# Patient Record
Sex: MALE | Race: OTHER | HISPANIC OR LATINO | Employment: FULL TIME | URBAN - METROPOLITAN AREA
[De-identification: names, ages, dates, MRNs, and addresses within clinical notes are randomized per-mention and may not be internally consistent; named-entity substitution may affect disease eponyms.]

---

## 2020-10-21 ENCOUNTER — OFFICE VISIT (OUTPATIENT)
Dept: URGENT CARE | Age: 25
End: 2020-10-21
Payer: COMMERCIAL

## 2020-10-21 VITALS
OXYGEN SATURATION: 100 % | HEIGHT: 68 IN | RESPIRATION RATE: 18 BRPM | TEMPERATURE: 98.1 F | HEART RATE: 95 BPM | BODY MASS INDEX: 28.79 KG/M2 | WEIGHT: 190 LBS

## 2020-10-21 DIAGNOSIS — Z11.59 SPECIAL SCREENING EXAMINATION FOR UNSPECIFIED VIRAL DISEASE: Primary | ICD-10-CM

## 2020-10-21 PROCEDURE — U0003 INFECTIOUS AGENT DETECTION BY NUCLEIC ACID (DNA OR RNA); SEVERE ACUTE RESPIRATORY SYNDROME CORONAVIRUS 2 (SARS-COV-2) (CORONAVIRUS DISEASE [COVID-19]), AMPLIFIED PROBE TECHNIQUE, MAKING USE OF HIGH THROUGHPUT TECHNOLOGIES AS DESCRIBED BY CMS-2020-01-R: HCPCS | Performed by: PHYSICIAN ASSISTANT

## 2020-10-21 PROCEDURE — S9083 URGENT CARE CENTER GLOBAL: HCPCS | Performed by: PHYSICIAN ASSISTANT

## 2020-10-21 PROCEDURE — G0382 LEV 3 HOSP TYPE B ED VISIT: HCPCS | Performed by: PHYSICIAN ASSISTANT

## 2020-10-23 ENCOUNTER — DOCUMENTATION (OUTPATIENT)
Dept: URGENT CARE | Age: 25
End: 2020-10-23

## 2020-10-23 LAB — SARS-COV-2 RNA SPEC QL NAA+PROBE: NOT DETECTED

## 2023-07-22 ENCOUNTER — APPOINTMENT (EMERGENCY)
Dept: RADIOLOGY | Facility: HOSPITAL | Age: 28
End: 2023-07-22

## 2023-07-22 ENCOUNTER — HOSPITAL ENCOUNTER (EMERGENCY)
Facility: HOSPITAL | Age: 28
Discharge: HOME | End: 2023-07-22
Attending: EMERGENCY MEDICINE | Admitting: EMERGENCY MEDICINE

## 2023-07-22 VITALS
OXYGEN SATURATION: 99 % | SYSTOLIC BLOOD PRESSURE: 131 MMHG | TEMPERATURE: 98.3 F | HEART RATE: 68 BPM | DIASTOLIC BLOOD PRESSURE: 78 MMHG | RESPIRATION RATE: 18 BRPM

## 2023-07-22 DIAGNOSIS — S62.336A: Primary | ICD-10-CM

## 2023-07-22 DIAGNOSIS — S62.334A: ICD-10-CM

## 2023-07-22 PROCEDURE — 90471 IMMUNIZATION ADMIN: CPT

## 2023-07-22 PROCEDURE — 99284 EMERGENCY DEPT VISIT MOD MDM: CPT | Performed by: PHYSICIAN ASSISTANT

## 2023-07-22 PROCEDURE — 99283 EMERGENCY DEPT VISIT LOW MDM: CPT

## 2023-07-22 PROCEDURE — 90715 TDAP VACCINE 7 YRS/> IM: CPT | Performed by: PHYSICIAN ASSISTANT

## 2023-07-22 PROCEDURE — 73130 X-RAY EXAM OF HAND: CPT

## 2023-07-22 RX ORDER — HYDROCODONE BITARTRATE AND ACETAMINOPHEN 5; 325 MG/1; MG/1
1 TABLET ORAL EVERY 6 HOURS PRN
Qty: 10 TABLET | Refills: 0 | Status: SHIPPED | OUTPATIENT
Start: 2023-07-22 | End: 2023-07-26

## 2023-07-22 RX ORDER — CEPHALEXIN 250 MG/1
500 CAPSULE ORAL ONCE
Status: COMPLETED | OUTPATIENT
Start: 2023-07-22 | End: 2023-07-22

## 2023-07-22 RX ORDER — CEPHALEXIN 500 MG/1
500 CAPSULE ORAL EVERY 6 HOURS SCHEDULED
Qty: 28 CAPSULE | Refills: 0 | Status: SHIPPED | OUTPATIENT
Start: 2023-07-22 | End: 2023-07-29

## 2023-07-22 RX ORDER — ACETAMINOPHEN 325 MG/1
650 TABLET ORAL ONCE
Status: COMPLETED | OUTPATIENT
Start: 2023-07-22 | End: 2023-07-22

## 2023-07-22 RX ADMIN — TETANUS TOXOID, REDUCED DIPHTHERIA TOXOID AND ACELLULAR PERTUSSIS VACCINE, ADSORBED 0.5 ML: 5; 2.5; 8; 8; 2.5 SUSPENSION INTRAMUSCULAR at 11:25

## 2023-07-22 RX ADMIN — CEPHALEXIN 500 MG: 250 CAPSULE ORAL at 12:02

## 2023-07-22 RX ADMIN — ACETAMINOPHEN 325MG 650 MG: 325 TABLET ORAL at 11:25

## 2023-07-22 NOTE — Clinical Note
Jelena Headley was seen and treated in our emergency department on 7/22/2023. No work until cleared by Family Doctor/Orthopedics        Diagnosis:     Adama Crawford  . He may return on this date: If you have any questions or concerns, please don't hesitate to call.       Phoenix Joya PA-C    ______________________________           _______________          _______________  Hospital Representative                              Date                                Time

## 2023-07-22 NOTE — ED PROVIDER NOTES
History  Chief Complaint   Patient presents with   • Hand Injury     Pt hit metal forklift this morning at work. R hand injury, swelling/bleeding noted to area. Patient is a 35 y/o male with no significant PMH who presents for evaluation of right hand injury. He states he was at work this morning when he got into an argument with a coworker. He states he was upset and punched a forklift. He has pain, swelling, deformity to the 4th and 5th digits of the right hand. He has a small cut over the 5th MCP joint with a small amount of oozing blood. He has decreased ROM of the 4th and 5th digits. He came straight here for evaluation. He is right handed. He is unsure when his last tetanus immunization was. He denies other injury. Denies fever chills, nausea, vomiting, numbness. None       History reviewed. No pertinent past medical history. History reviewed. No pertinent surgical history. History reviewed. No pertinent family history. I have reviewed and agree with the history as documented. E-Cigarette/Vaping   • E-Cigarette Use Never User      E-Cigarette/Vaping Substances     Social History     Tobacco Use   • Smoking status: Every Day     Packs/day: 1.00     Types: Cigarettes   • Smokeless tobacco: Never   Vaping Use   • Vaping Use: Never used   Substance Use Topics   • Alcohol use: Never   • Drug use: Never       Review of Systems   Constitutional: Negative for chills and fever. HENT: Negative for ear pain and sore throat. Eyes: Negative for visual disturbance. Respiratory: Negative for shortness of breath. Cardiovascular: Negative for chest pain. Gastrointestinal: Negative for abdominal pain, diarrhea, nausea and vomiting. Genitourinary: Negative for difficulty urinating. Musculoskeletal: Positive for arthralgias and joint swelling. Negative for back pain. Skin: Positive for wound. Negative for color change and rash.    Neurological: Positive for numbness (tingling to right hand/fingers). All other systems reviewed and are negative. Physical Exam  Physical Exam  Vitals and nursing note reviewed. Constitutional:       General: He is not in acute distress. Appearance: Normal appearance. He is well-developed. He is not ill-appearing, toxic-appearing or diaphoretic. HENT:      Head: Normocephalic and atraumatic. Right Ear: External ear normal.      Left Ear: External ear normal.   Eyes:      Conjunctiva/sclera: Conjunctivae normal.   Cardiovascular:      Rate and Rhythm: Normal rate and regular rhythm. Heart sounds: Normal heart sounds. No murmur heard. Pulmonary:      Effort: Pulmonary effort is normal. No respiratory distress. Breath sounds: Normal breath sounds. No stridor. No wheezing, rhonchi or rales. Abdominal:      General: Abdomen is flat. There is no distension. Musculoskeletal:         General: No swelling. Cervical back: Normal range of motion. Comments: Right hand- pain, swelling, deformity to the 4th and 5th distal metacarpals and MCP joint. There is a small cut over the 5th MCP joint with mild oozing. No foreign body/contamination. Limited ROM of the 4th and 5th digit/MCP joint. No wrist pain/deformity. NVI distally. Radial pulse intact. Capillary refill intact. Skin:     General: Skin is warm. Capillary Refill: Capillary refill takes less than 2 seconds. Neurological:      Mental Status: He is alert.    Psychiatric:         Mood and Affect: Mood normal.         Vital Signs  ED Triage Vitals   Temperature Pulse Respirations Blood Pressure SpO2   07/22/23 1112 07/22/23 1110 07/22/23 1110 07/22/23 1110 07/22/23 1110   98.3 °F (36.8 °C) 68 18 131/78 99 %      Temp Source Heart Rate Source Patient Position - Orthostatic VS BP Location FiO2 (%)   07/22/23 1112 07/22/23 1110 07/22/23 1110 07/22/23 1110 --   Oral Monitor Sitting Left arm       Pain Score       07/22/23 1110       10 - Worst Possible Pain           Vitals: 07/22/23 1110   BP: 131/78   Pulse: 68   Patient Position - Orthostatic VS: Sitting         Visual Acuity      ED Medications  Medications   acetaminophen (TYLENOL) tablet 650 mg (650 mg Oral Given 7/22/23 1125)   tetanus-diphtheria-acellular pertussis (BOOSTRIX) IM injection 0.5 mL (0.5 mL Intramuscular Given 7/22/23 1125)   cephalexin (KEFLEX) capsule 500 mg (500 mg Oral Given 7/22/23 1202)       Diagnostic Studies  Results Reviewed     None                 XR hand 3+ views RIGHT    (Results Pending)              Procedures  Splint application    Date/Time: 7/22/2023 12:30 PM    Performed by: Bandar Rutledge PA-C  Authorized by: Bandar Rutledge PA-C  Universal Protocol:  Procedure performed by: (ED nurse and tech)  Consent: Verbal consent obtained. Consent given by: patient  Time out: Immediately prior to procedure a "time out" was called to verify the correct patient, procedure, equipment, support staff and site/side marked as required. Patient understanding: patient states understanding of the procedure being performed  Site marked: the operative site was marked  Radiology Images displayed and confirmed. If images not available, report reviewed: imaging studies available  Patient identity confirmed: verbally with patient      Pre-procedure details:     Sensation:  Normal  Procedure details:     Laterality:  Right    Location:  Hand    Hand:  R hand    Splint type:  Ulnar gutter    Supplies:  Cotton padding, Ortho-Glass and elastic bandage  Post-procedure details:     Pain:  Improved    Sensation:  Normal    Patient tolerance of procedure: Tolerated well, no immediate complications             ED Course                                             Medical Decision Making  Plan- will check right hand xray. Will give tylenol and boostrix.    Xray right hand- fracture of the 4th and 5th distal metacarpals with angulation/dislocation  Tiger text sent to hand surgery on call Dr. Carly Song including pictures of xrays and clinical images. Recommends cleaning off wound with betadine and rinsing with a liter of saline over the wound. Dress with xeroform and apply ulnar gutter splint. They will get the patient into the clinic this week. Update tetanus and give 7 days of Keflex as precaution. Will likely benefit from pinning as outpatient. Do not need to attempt reduction as it will likely not stay. Discussed all results/reviewed xrays with the patient. The management plan was discussed in detail with the patient at bedside and all questions were answered. Merril Speed to discharge, we provided both verbal and written instructions.  We discussed with the patient the signs and symptoms for which to return to the emergency department.  All questions were answered and patient was comfortable with the plan of care and discharged to home.  Instructed the patient to follow up with the primary care provider and/or specialist provided and their written instructions.  The patient verbalized understanding of our discussion and plan of care, and agrees to return to the Emergency Department for concerns and progression of illness.     At discharge, I instructed the patient to:  -follow up with pcp  -follow up with the recommended specialists- orthopedics/hand surgery   -return to the ER if symptoms worsened or new symptoms arose  Patient agreed to this plan and was stable at time of discharge. Displaced fracture of neck of right fifth metacarpal bone: acute illness or injury  Displaced fracture of neck of right fourth metacarpal bone: acute illness or injury  Amount and/or Complexity of Data Reviewed  Radiology: ordered and independent interpretation performed. Decision-making details documented in ED Course. Discussion of management or test interpretation with external provider(s): Hand surgery Dr. Rosy Avila  OTC drugs. Prescription drug management.           Disposition  Final diagnoses:   Displaced fracture of neck of right fifth metacarpal bone   Displaced fracture of neck of right fourth metacarpal bone     Time reflects when diagnosis was documented in both MDM as applicable and the Disposition within this note     Time User Action Codes Description Comment    7/22/2023 12:13 PM Phyllistine Esquivel Add [S62.339A] Fracture of metacarpal neck of right hand, closed     7/22/2023 12:13 PM Phyllistine Esquivel Remove [S62.339A] Fracture of metacarpal neck of right hand, closed     7/22/2023 12:13 PM Phyllistine Esquivel Add [S62.336A] Displaced fracture of neck of right fifth metacarpal bone     7/22/2023 12:14 PM Phyllistine Esquivel Add [G52.693Y] Displaced fracture of neck of right fourth metacarpal bone       ED Disposition     ED Disposition   Discharge    Condition   Stable    Date/Time   Sat Jul 22, 2023 12:14 PM    Comment   Burnice Lock discharge to home/self care.                Follow-up Information     Follow up With Specialties Details Why Contact Info Additional Information    Alondra Levi MD Orthopedic Surgery, Hand Surgery Schedule an appointment as soon as possible for a visit in 1 day  [address] [facility]        [address] [place] [address] Orthopedic Surgery Call  Call the office Monday morning to set up an appointment for your hand fractures [address]  [address] [address]  [address] [place] [address], [address] [place], [place], [address]   [facility] Emergency Department Emergency Medicine  If symptoms worsen [address] [address]  [address] [facility] Emergency Department, [address] [facility], [place], [address]          Discharge Medication List as of 7/22/2023 12:39 PM      START taking these medications    Details   cephalexin (KEFLEX) 500 mg capsule Take 1 capsule (500 mg total) by mouth every 6 (six) hours for 7 days, Starting Sat 7/22/2023, Until Sat 7/29/2023, Normal      HYDROcodone-acetaminophen (Norco) 5-325 mg per tablet Take 1 tablet by mouth every 6 (six) hours as needed for pain Max Daily Amount: 4 tablets, Starting Sat 7/22/2023, Normal                 PDMP Review     None          ED Provider  Electronically Signed by           Mason López PA-C  07/22/23 9166

## 2023-07-24 ENCOUNTER — OFFICE VISIT (OUTPATIENT)
Dept: OBGYN CLINIC | Facility: CLINIC | Age: 28
End: 2023-07-24

## 2023-07-24 VITALS — HEIGHT: 68 IN | BODY MASS INDEX: 31.71 KG/M2 | WEIGHT: 209.2 LBS

## 2023-07-24 DIAGNOSIS — S62.336A: ICD-10-CM

## 2023-07-24 DIAGNOSIS — S62.334A: ICD-10-CM

## 2023-07-24 DIAGNOSIS — S62.304A CLOSED DISPLACED FRACTURE OF FOURTH METACARPAL BONE OF RIGHT HAND, UNSPECIFIED PORTION OF METACARPAL, INITIAL ENCOUNTER: Primary | ICD-10-CM

## 2023-07-24 DIAGNOSIS — S62.306A CLOSED DISPLACED FRACTURE OF FIFTH METACARPAL BONE OF RIGHT HAND, UNSPECIFIED PORTION OF METACARPAL, INITIAL ENCOUNTER: ICD-10-CM

## 2023-07-24 PROCEDURE — 99204 OFFICE O/P NEW MOD 45 MIN: CPT | Performed by: STUDENT IN AN ORGANIZED HEALTH CARE EDUCATION/TRAINING PROGRAM

## 2023-07-24 PROCEDURE — 29125 APPL SHORT ARM SPLINT STATIC: CPT | Performed by: STUDENT IN AN ORGANIZED HEALTH CARE EDUCATION/TRAINING PROGRAM

## 2023-07-24 NOTE — TELEPHONE ENCOUNTER
Caller: Patient     Doctor/Office: Hand     Call regarding :  Scheduling appointment      Call was transferred to: Sub Scheduling

## 2023-07-24 NOTE — H&P (VIEW-ONLY)
ORTHOPAEDIC HAND, WRIST, AND ELBOW OFFICE  VISIT       ASSESSMENT/PLAN:      Diagnoses and all orders for this visit:    Closed displaced fracture of fourth metacarpal bone of right hand, unspecified portion of metacarpal, initial encounter  -     Splint application  -     Case request operating room: CLOSED REDUCTION PERCUTANEOUS PINNING VERSUS OPEN REDUCTION W/ INTERNAL FIXATION (ORIF) FOURTH AND FIFTH METACARPAL; Standing  -     Case request operating room: CLOSED REDUCTION PERCUTANEOUS PINNING VERSUS OPEN REDUCTION W/ INTERNAL FIXATION (ORIF) FOURTH AND FIFTH METACARPAL    Closed displaced fracture of fifth metacarpal bone of right hand, unspecified portion of metacarpal, initial encounter  -     Splint application    Other orders  -     Diet NPO; Sips with meds; Standing  -     Void on call to OR; Standing  -     Insert peripheral IV; Standing  -     ceFAZolin (ANCEF) 2,000 mg in dextrose 5 % 100 mL IVPB          34 y.o. male with right fourth and fifth metacarpal neck fractures, DOI 7/22/23  Treatment options and expected outcomes were discussed. The patient verbalized understanding of exam findings and treatment plan. The patient was given the opportunity to ask questions. Questions were answered to the patient's satisfaction. Discussed non operative versus surgical intervention. I discussed with the patient that since he has two metacarpal fractures I would recommend surgical intervention. Discussed if we treat this non operatively he may have an extensor lag and dropped knuckles. The patient decided to move forward with surgical intervention via shared decision making. The patient elected to proceed with surgical intervention in the form of CRPP versus ORIF right fourth and fifth metacarpals.  .Risks of the surgery are inclusive of but not limited to bleeding, infection, nerve injury, blood clot, worsening of symptoms, not achieving the anticipated results, persistent stiffness, weakness and the need for additional surgery. The patient verbally stated they understood those risks and would like to proceed with the surgery. Surgical consent was signed in the office today. I will see him the day of surgery. He was placed into an ulnar gutter splint to include the long finger in the office today. Follow Up: After surgery       To Do Next Visit:  Splint off and X-rays of the  right  hand with 10 degree supinated lateral view      Discussions:  Fracture Operative Treatment: The physiology of a fractured bone was discussed with the patient today. With non-displaced or minimally displaced fractures, conservative treatment such as casting or splinting often results in a functional recovery. Typically, these fractures are immobilized in either a cast or splint depending on the pattern. Radiographs are typically taken at intervals throughout the fracture healing to ensure that reduction or alignment is not lost.  If the fracture loses its alignment, surgical intervention may be required to stabilize it. Medical conditions such as diabetes, osteoporosis, vitamin D deficiency, and a history of or exposure to smoking may delay or prevent fracture healing. Options between cast/splint immobilization and surgical treatment were offered and the risks and benefits of both were discussed. With displaced fractures, operative treatment often results in a functional recovery. Typically, these fractures undergo reduction either through percutaneous or open methods depending on the location and fracture pattern. Radiographs are typically taken at intervals throughout the fracture healing ensure maintenance of reduction and alignment. If the fracture loses its alignment, revision surgery may be required. Medical conditions such as diabetes, osteoporosis, vitamin D deficiency, and a history of or exposure to smoking may delay or prevent fracture healing.   The risks and benefits of the procedure were explained to the patient, which include, but are not limited to: Bleeding, infection, recurrence, pain, scar, malunion, nonunion, damage to tendons, damage to nerves, and damage to blood vessels, and complications related to anesthesia, failure to give desire result, need for more surgery. These risks, along with alternative conservative treatment options, and postoperative protocols were voiced back and understood by the patient. All questions were answered to the patient's satisfaction. The patient agrees to comply with a standard postoperative protocol, and is willing to proceed. Education was provided via written and auditory forms. There were no barriers to learning. Written handouts regarding wound care, incision and scar care, and general preoperative information was provided to the patient. Prior to surgery, the patient may be requested to stop all anti-inflammatory medications. Prophylactic aspirin, Plavix, and Coumadin may be allowed to be continued. Medications including vitamin E., ginkgo, and fish oil are requested to be stopped approximately one week prior to surgery. Hypertensive medications and beta blockers, if taken, should be continued. Henretta Rubinstein, MD  Attending, Orthopaedic Surgery  Hand, Wrist, and Elbow Surgery  [facility]    ____________________________________________________________________________________________________________________________________________      CHIEF COMPLAINT:  Chief Complaint   Patient presents with   • Right Hand - Pain       SUBJECTIVE:  Patient is a 34 y.o. RHD male who presents today for evaluation and treatment of right hand pain. The patient is referred by Velia Costa PA-C. The patient states he was at work on 7/22/23 and punched a metal forklift. He noted immediate pain in his hand. He presented to the ED after the injury where x-rays were taken and he was placed in a splint. The patient has been tolerating the splint well.  He notes intermittent pain over his 4th and 5th metacarpals. He has been taking Hydrocodone as needed for pain. He was also prescribed Keflex which he has been compliant with. The patient works at [employer]. He has been out of work since. I have personally reviewed all the relevant PMH, PSH, SH, FH, Medications and allergies      PAST MEDICAL HISTORY:  History reviewed. No pertinent past medical history. PAST SURGICAL HISTORY:  History reviewed. No pertinent surgical history. FAMILY HISTORY:  History reviewed. No pertinent family history. SOCIAL HISTORY:  Social History     Tobacco Use   • Smoking status: Every Day     Packs/day: 1.00     Types: Cigarettes   • Smokeless tobacco: Never   Vaping Use   • Vaping Use: Never used   Substance Use Topics   • Alcohol use: Never   • Drug use: Never       MEDICATIONS:    Current Outpatient Medications:   •  cephalexin (KEFLEX) 500 mg capsule, Take 1 capsule (500 mg total) by mouth every 6 (six) hours for 7 days, Disp: 28 capsule, Rfl: 0  •  HYDROcodone-acetaminophen (Norco) 5-325 mg per tablet, Take 1 tablet by mouth every 6 (six) hours as needed for pain Max Daily Amount: 4 tablets, Disp: 10 tablet, Rfl: 0    ALLERGIES:  No Known Allergies        REVIEW OF SYSTEMS:  Musculoskeletal:        As noted in HPI. All other systems reviewed and are negative. VITALS:  There were no vitals filed for this visit.     LABS:  HgA1c: No results found for: "HGBA1C"  BMP: No results found for: "GLUCOSE", "CALCIUM", "NA", "K", "CO2", "CL", "BUN", "CREATININE"    _____________________________________________________  PHYSICAL EXAMINATION:  General: Well developed and well nourished, alert & oriented x 3, appears comfortable  Psychiatric: Normal  HEENT: Normocephalic, Atraumatic Trachea Midline, No torticollis  Pulmonary: No audible wheezing or respiratory distress   Abdomen/GI: Non tender, non distended   Cardiovascular: No pitting edema, 2+ radial pulse   Skin: No masses, erythema, lacerations, fluctation, ulcerations  Neurovascular: Sensation Intact to the Median, Ulnar, Radial Nerve, Motor Intact to the Median, Ulnar, Radial Nerve and Pulses Intact  Musculoskeletal: Normal, except as noted in detailed exam and in HPI. MUSCULOSKELETAL EXAMINATION:  Right hand   Superficial abrasion between 4th and 5th metacarpals   TTP fx sites  15 ext lag MCP ring and small   Compartments soft  Brisk capillary refill   ___________________________________________________  STUDIES REVIEWED:  Xrays of the right hand were reviewed and independently interpreted in PACS by Dr. Edgardo Patel and demonstrate right 4th and 5th metacarpal fractures with appx 50 degree flexion at the ring finger and 60 dgree flexion small finger. PROCEDURES PERFORMED:  Splint application    Date/Time: 7/24/2023 3:30 PM    Performed by: Mai Singh MD  Authorized by: Mai Singh MD  Universal Protocol:  Consent: Verbal consent obtained. Consent given by: patient  Patient identity confirmed: verbally with patient      Procedure details:     Laterality:  Right    Splint type:  Ulnar gutter (including long finger)    Supplies:  Cotton padding, Ortho-Glass and elastic bandage  Post-procedure details:     Patient tolerance of procedure:   Tolerated well, no immediate complications           _____________________________________________________      Scribe Attestation    I,:  Krista Joy MA am acting as a scribe while in the presence of the attending physician.:       I,:  Mai Singh MD personally performed the services described in this documentation    as scribed in my presence.:

## 2023-07-24 NOTE — PROGRESS NOTES
ORTHOPAEDIC HAND, WRIST, AND ELBOW OFFICE  VISIT       ASSESSMENT/PLAN:      Diagnoses and all orders for this visit:    Closed displaced fracture of fourth metacarpal bone of right hand, unspecified portion of metacarpal, initial encounter  -     Splint application  -     Case request operating room: CLOSED REDUCTION PERCUTANEOUS PINNING VERSUS OPEN REDUCTION W/ INTERNAL FIXATION (ORIF) FOURTH AND FIFTH METACARPAL; Standing  -     Case request operating room: CLOSED REDUCTION PERCUTANEOUS PINNING VERSUS OPEN REDUCTION W/ INTERNAL FIXATION (ORIF) FOURTH AND FIFTH METACARPAL    Closed displaced fracture of fifth metacarpal bone of right hand, unspecified portion of metacarpal, initial encounter  -     Splint application    Other orders  -     Diet NPO; Sips with meds; Standing  -     Void on call to OR; Standing  -     Insert peripheral IV; Standing  -     ceFAZolin (ANCEF) 2,000 mg in dextrose 5 % 100 mL IVPB          34 y.o. male with right fourth and fifth metacarpal neck fractures, DOI 7/22/23  Treatment options and expected outcomes were discussed. The patient verbalized understanding of exam findings and treatment plan. The patient was given the opportunity to ask questions. Questions were answered to the patient's satisfaction. Discussed non operative versus surgical intervention. I discussed with the patient that since he has two metacarpal fractures I would recommend surgical intervention. Discussed if we treat this non operatively he may have an extensor lag and dropped knuckles. The patient decided to move forward with surgical intervention via shared decision making. The patient elected to proceed with surgical intervention in the form of CRPP versus ORIF right fourth and fifth metacarpals.  .Risks of the surgery are inclusive of but not limited to bleeding, infection, nerve injury, blood clot, worsening of symptoms, not achieving the anticipated results, persistent stiffness, weakness and the need for additional surgery. The patient verbally stated they understood those risks and would like to proceed with the surgery. Surgical consent was signed in the office today. I will see him the day of surgery. He was placed into an ulnar gutter splint to include the long finger in the office today. Follow Up: After surgery       To Do Next Visit:  Splint off and X-rays of the  right  hand with 10 degree supinated lateral view      Discussions:  Fracture Operative Treatment: The physiology of a fractured bone was discussed with the patient today. With non-displaced or minimally displaced fractures, conservative treatment such as casting or splinting often results in a functional recovery. Typically, these fractures are immobilized in either a cast or splint depending on the pattern. Radiographs are typically taken at intervals throughout the fracture healing to ensure that reduction or alignment is not lost.  If the fracture loses its alignment, surgical intervention may be required to stabilize it. Medical conditions such as diabetes, osteoporosis, vitamin D deficiency, and a history of or exposure to smoking may delay or prevent fracture healing. Options between cast/splint immobilization and surgical treatment were offered and the risks and benefits of both were discussed. With displaced fractures, operative treatment often results in a functional recovery. Typically, these fractures undergo reduction either through percutaneous or open methods depending on the location and fracture pattern. Radiographs are typically taken at intervals throughout the fracture healing ensure maintenance of reduction and alignment. If the fracture loses its alignment, revision surgery may be required. Medical conditions such as diabetes, osteoporosis, vitamin D deficiency, and a history of or exposure to smoking may delay or prevent fracture healing.   The risks and benefits of the procedure were explained to the patient, which include, but are not limited to: Bleeding, infection, recurrence, pain, scar, malunion, nonunion, damage to tendons, damage to nerves, and damage to blood vessels, and complications related to anesthesia, failure to give desire result, need for more surgery. These risks, along with alternative conservative treatment options, and postoperative protocols were voiced back and understood by the patient. All questions were answered to the patient's satisfaction. The patient agrees to comply with a standard postoperative protocol, and is willing to proceed. Education was provided via written and auditory forms. There were no barriers to learning. Written handouts regarding wound care, incision and scar care, and general preoperative information was provided to the patient. Prior to surgery, the patient may be requested to stop all anti-inflammatory medications. Prophylactic aspirin, Plavix, and Coumadin may be allowed to be continued. Medications including vitamin E., ginkgo, and fish oil are requested to be stopped approximately one week prior to surgery. Hypertensive medications and beta blockers, if taken, should be continued. Nasreen Polk MD  Attending, Orthopaedic Surgery  Hand, Wrist, and Elbow Surgery  [facility]    ____________________________________________________________________________________________________________________________________________      CHIEF COMPLAINT:  Chief Complaint   Patient presents with   • Right Hand - Pain       SUBJECTIVE:  Patient is a 34 y.o. RHD male who presents today for evaluation and treatment of right hand pain. The patient is referred by Michelle Bermudez PA-C. The patient states he was at work on 7/22/23 and punched a metal forklift. He noted immediate pain in his hand. He presented to the ED after the injury where x-rays were taken and he was placed in a splint. The patient has been tolerating the splint well.  He notes intermittent pain over his 4th and 5th metacarpals. He has been taking Hydrocodone as needed for pain. He was also prescribed Keflex which he has been compliant with. The patient works at [employer]. He has been out of work since. I have personally reviewed all the relevant PMH, PSH, SH, FH, Medications and allergies      PAST MEDICAL HISTORY:  History reviewed. No pertinent past medical history. PAST SURGICAL HISTORY:  History reviewed. No pertinent surgical history. FAMILY HISTORY:  History reviewed. No pertinent family history. SOCIAL HISTORY:  Social History     Tobacco Use   • Smoking status: Every Day     Packs/day: 1.00     Types: Cigarettes   • Smokeless tobacco: Never   Vaping Use   • Vaping Use: Never used   Substance Use Topics   • Alcohol use: Never   • Drug use: Never       MEDICATIONS:    Current Outpatient Medications:   •  cephalexin (KEFLEX) 500 mg capsule, Take 1 capsule (500 mg total) by mouth every 6 (six) hours for 7 days, Disp: 28 capsule, Rfl: 0  •  HYDROcodone-acetaminophen (Norco) 5-325 mg per tablet, Take 1 tablet by mouth every 6 (six) hours as needed for pain Max Daily Amount: 4 tablets, Disp: 10 tablet, Rfl: 0    ALLERGIES:  No Known Allergies        REVIEW OF SYSTEMS:  Musculoskeletal:        As noted in HPI. All other systems reviewed and are negative. VITALS:  There were no vitals filed for this visit.     LABS:  HgA1c: No results found for: "HGBA1C"  BMP: No results found for: "GLUCOSE", "CALCIUM", "NA", "K", "CO2", "CL", "BUN", "CREATININE"    _____________________________________________________  PHYSICAL EXAMINATION:  General: Well developed and well nourished, alert & oriented x 3, appears comfortable  Psychiatric: Normal  HEENT: Normocephalic, Atraumatic Trachea Midline, No torticollis  Pulmonary: No audible wheezing or respiratory distress   Abdomen/GI: Non tender, non distended   Cardiovascular: No pitting edema, 2+ radial pulse   Skin: No masses, erythema, lacerations, fluctation, ulcerations  Neurovascular: Sensation Intact to the Median, Ulnar, Radial Nerve, Motor Intact to the Median, Ulnar, Radial Nerve and Pulses Intact  Musculoskeletal: Normal, except as noted in detailed exam and in HPI. MUSCULOSKELETAL EXAMINATION:  Right hand   Superficial abrasion between 4th and 5th metacarpals   TTP fx sites  15 ext lag MCP ring and small   Compartments soft  Brisk capillary refill   ___________________________________________________  STUDIES REVIEWED:  Xrays of the right hand were reviewed and independently interpreted in PACS by Dr. Susan Nickerson and demonstrate right 4th and 5th metacarpal fractures with appx 50 degree flexion at the ring finger and 60 dgree flexion small finger. PROCEDURES PERFORMED:  Splint application    Date/Time: 7/24/2023 3:30 PM    Performed by: Rosebud Severin, MD  Authorized by: Rosebud Severin, MD  Universal Protocol:  Consent: Verbal consent obtained. Consent given by: patient  Patient identity confirmed: verbally with patient      Procedure details:     Laterality:  Right    Splint type:  Ulnar gutter (including long finger)    Supplies:  Cotton padding, Ortho-Glass and elastic bandage  Post-procedure details:     Patient tolerance of procedure:   Tolerated well, no immediate complications           _____________________________________________________      Scribe Attestation    I,:  Krista Joy MA am acting as a scribe while in the presence of the attending physician.:       I,:  Rosebud Severin, MD personally performed the services described in this documentation    as scribed in my presence.:

## 2023-07-24 NOTE — TELEPHONE ENCOUNTER
----- Message from Mayte Yanez MD sent at 7/22/2023 12:23 PM EDT -----  Please schedule patient follow-up Monday (or Tuesday morning at latest) for fourth and fifth metacarpal fractures. Thank you.

## 2023-07-25 ENCOUNTER — ANESTHESIA EVENT (OUTPATIENT)
Dept: PERIOP | Facility: AMBULARY SURGERY CENTER | Age: 28
End: 2023-07-25

## 2023-07-25 ENCOUNTER — TELEPHONE (OUTPATIENT)
Dept: OBGYN CLINIC | Facility: CLINIC | Age: 28
End: 2023-07-25

## 2023-07-25 NOTE — TELEPHONE ENCOUNTER
S/w pt about his surgery with Dr. Bret Brunner scheduled for tomorrow-7/26. Provided instructions, p/o appt, and  information. Pt is uninsured and will reach out to [facility], [place]. Pt verbalized understanding.

## 2023-07-26 ENCOUNTER — HOSPITAL ENCOUNTER (OUTPATIENT)
Facility: AMBULARY SURGERY CENTER | Age: 28
Setting detail: OUTPATIENT SURGERY
Discharge: HOME | End: 2023-07-26
Attending: STUDENT IN AN ORGANIZED HEALTH CARE EDUCATION/TRAINING PROGRAM | Admitting: STUDENT IN AN ORGANIZED HEALTH CARE EDUCATION/TRAINING PROGRAM

## 2023-07-26 ENCOUNTER — ANESTHESIA (OUTPATIENT)
Dept: PERIOP | Facility: AMBULARY SURGERY CENTER | Age: 28
End: 2023-07-26

## 2023-07-26 ENCOUNTER — APPOINTMENT (OUTPATIENT)
Dept: RADIOLOGY | Facility: AMBULARY SURGERY CENTER | Age: 28
End: 2023-07-26

## 2023-07-26 VITALS
BODY MASS INDEX: 31.67 KG/M2 | HEIGHT: 68 IN | SYSTOLIC BLOOD PRESSURE: 139 MMHG | WEIGHT: 209 LBS | OXYGEN SATURATION: 96 % | HEART RATE: 75 BPM | TEMPERATURE: 98.6 F | DIASTOLIC BLOOD PRESSURE: 73 MMHG | RESPIRATION RATE: 18 BRPM

## 2023-07-26 DIAGNOSIS — Z47.89 AFTERCARE FOLLOWING SURGERY OF THE MUSCULOSKELETAL SYSTEM: ICD-10-CM

## 2023-07-26 DIAGNOSIS — S62.336D CLOSED DISPLACED FRACTURE OF NECK OF FIFTH METACARPAL BONE OF RIGHT HAND WITH ROUTINE HEALING: Primary | ICD-10-CM

## 2023-07-26 DIAGNOSIS — S62.334D CLOSED DISPLACED FRACTURE OF NECK OF FOURTH METACARPAL BONE OF RIGHT HAND WITH ROUTINE HEALING, SUBSEQUENT ENCOUNTER: ICD-10-CM

## 2023-07-26 PROBLEM — S62.304A CLOSED DISPLACED FRACTURE OF FOURTH METACARPAL BONE OF RIGHT HAND: Status: ACTIVE | Noted: 2023-07-26

## 2023-07-26 PROCEDURE — 26608 TREAT METACARPAL FRACTURE: CPT | Performed by: STUDENT IN AN ORGANIZED HEALTH CARE EDUCATION/TRAINING PROGRAM

## 2023-07-26 PROCEDURE — C1713 ANCHOR/SCREW BN/BN,TIS/BN: HCPCS | Performed by: STUDENT IN AN ORGANIZED HEALTH CARE EDUCATION/TRAINING PROGRAM

## 2023-07-26 PROCEDURE — 73120 X-RAY EXAM OF HAND: CPT

## 2023-07-26 PROCEDURE — 26608 TREAT METACARPAL FRACTURE: CPT | Performed by: PHYSICIAN ASSISTANT

## 2023-07-26 DEVICE — C-WIRE PAK DOUBLE ENDED ORTHOPAEDIC WIRE, SPADE, .062" (1.57 MM)
Type: IMPLANTABLE DEVICE | Site: HAND | Status: FUNCTIONAL
Brand: C-WIRE

## 2023-07-26 RX ORDER — MIDAZOLAM HYDROCHLORIDE 2 MG/2ML
INJECTION, SOLUTION INTRAMUSCULAR; INTRAVENOUS AS NEEDED
Status: DISCONTINUED | OUTPATIENT
Start: 2023-07-26 | End: 2023-07-26

## 2023-07-26 RX ORDER — FENTANYL CITRATE/PF 50 MCG/ML
25 SYRINGE (ML) INJECTION
Status: DISCONTINUED | OUTPATIENT
Start: 2023-07-26 | End: 2023-07-26 | Stop reason: HOSPADM

## 2023-07-26 RX ORDER — KETAMINE HCL IN NACL, ISO-OSM 100MG/10ML
SYRINGE (ML) INJECTION AS NEEDED
Status: DISCONTINUED | OUTPATIENT
Start: 2023-07-26 | End: 2023-07-26

## 2023-07-26 RX ORDER — ONDANSETRON 2 MG/ML
4 INJECTION INTRAMUSCULAR; INTRAVENOUS EVERY 4 HOURS PRN
Status: DISCONTINUED | OUTPATIENT
Start: 2023-07-26 | End: 2023-07-26 | Stop reason: HOSPADM

## 2023-07-26 RX ORDER — CEFAZOLIN SODIUM 2 G/50ML
2000 SOLUTION INTRAVENOUS ONCE
Status: COMPLETED | OUTPATIENT
Start: 2023-07-26 | End: 2023-07-26

## 2023-07-26 RX ORDER — DEXAMETHASONE SODIUM PHOSPHATE 10 MG/ML
INJECTION, SOLUTION INTRAMUSCULAR; INTRAVENOUS AS NEEDED
Status: DISCONTINUED | OUTPATIENT
Start: 2023-07-26 | End: 2023-07-26

## 2023-07-26 RX ORDER — FENTANYL CITRATE 50 UG/ML
INJECTION, SOLUTION INTRAMUSCULAR; INTRAVENOUS AS NEEDED
Status: DISCONTINUED | OUTPATIENT
Start: 2023-07-26 | End: 2023-07-26

## 2023-07-26 RX ORDER — SODIUM CHLORIDE, SODIUM LACTATE, POTASSIUM CHLORIDE, CALCIUM CHLORIDE 600; 310; 30; 20 MG/100ML; MG/100ML; MG/100ML; MG/100ML
INJECTION, SOLUTION INTRAVENOUS CONTINUOUS PRN
Status: DISCONTINUED | OUTPATIENT
Start: 2023-07-26 | End: 2023-07-26

## 2023-07-26 RX ORDER — PROPOFOL 10 MG/ML
INJECTION, EMULSION INTRAVENOUS AS NEEDED
Status: DISCONTINUED | OUTPATIENT
Start: 2023-07-26 | End: 2023-07-26

## 2023-07-26 RX ORDER — OXYCODONE HYDROCHLORIDE 5 MG/1
5 TABLET ORAL EVERY 4 HOURS PRN
Qty: 10 TABLET | Refills: 0 | Status: SHIPPED | OUTPATIENT
Start: 2023-07-26 | End: 2023-07-28 | Stop reason: SDUPTHER

## 2023-07-26 RX ORDER — PROPOFOL 10 MG/ML
INJECTION, EMULSION INTRAVENOUS CONTINUOUS PRN
Status: DISCONTINUED | OUTPATIENT
Start: 2023-07-26 | End: 2023-07-26

## 2023-07-26 RX ORDER — GLYCOPYRROLATE 0.2 MG/ML
INJECTION INTRAMUSCULAR; INTRAVENOUS AS NEEDED
Status: DISCONTINUED | OUTPATIENT
Start: 2023-07-26 | End: 2023-07-26

## 2023-07-26 RX ORDER — MAGNESIUM HYDROXIDE 1200 MG/15ML
LIQUID ORAL AS NEEDED
Status: DISCONTINUED | OUTPATIENT
Start: 2023-07-26 | End: 2023-07-26 | Stop reason: HOSPADM

## 2023-07-26 RX ORDER — BUPIVACAINE HYDROCHLORIDE 5 MG/ML
INJECTION, SOLUTION EPIDURAL; INTRACAUDAL AS NEEDED
Status: DISCONTINUED | OUTPATIENT
Start: 2023-07-26 | End: 2023-07-26

## 2023-07-26 RX ORDER — OXYCODONE HYDROCHLORIDE 5 MG/1
5 TABLET ORAL EVERY 4 HOURS PRN
Status: DISCONTINUED | OUTPATIENT
Start: 2023-07-26 | End: 2023-07-26 | Stop reason: HOSPADM

## 2023-07-26 RX ORDER — ONDANSETRON 2 MG/ML
INJECTION INTRAMUSCULAR; INTRAVENOUS AS NEEDED
Status: DISCONTINUED | OUTPATIENT
Start: 2023-07-26 | End: 2023-07-26

## 2023-07-26 RX ORDER — DIPHENHYDRAMINE HYDROCHLORIDE 50 MG/ML
12.5 INJECTION INTRAMUSCULAR; INTRAVENOUS ONCE AS NEEDED
Status: DISCONTINUED | OUTPATIENT
Start: 2023-07-26 | End: 2023-07-26 | Stop reason: HOSPADM

## 2023-07-26 RX ADMIN — FENTANYL CITRATE 25 MCG: 50 INJECTION, SOLUTION INTRAMUSCULAR; INTRAVENOUS at 11:04

## 2023-07-26 RX ADMIN — GLYCOPYRROLATE 0.1 MG: 0.2 INJECTION, SOLUTION INTRAMUSCULAR; INTRAVENOUS at 11:05

## 2023-07-26 RX ADMIN — Medication 20 MG: at 11:08

## 2023-07-26 RX ADMIN — PROPOFOL 100 MG: 10 INJECTION, EMULSION INTRAVENOUS at 11:07

## 2023-07-26 RX ADMIN — PROPOFOL 100 MCG/KG/MIN: 10 INJECTION, EMULSION INTRAVENOUS at 11:07

## 2023-07-26 RX ADMIN — BUPIVACAINE HYDROCHLORIDE 20 ML: 5 INJECTION, SOLUTION EPIDURAL; INTRACAUDAL; PERINEURAL at 10:15

## 2023-07-26 RX ADMIN — ONDANSETRON 4 MG: 2 INJECTION INTRAMUSCULAR; INTRAVENOUS at 11:05

## 2023-07-26 RX ADMIN — FENTANYL CITRATE 25 MCG: 50 INJECTION, SOLUTION INTRAMUSCULAR; INTRAVENOUS at 11:13

## 2023-07-26 RX ADMIN — DEXAMETHASONE SODIUM PHOSPHATE 10 MG: 10 INJECTION, SOLUTION INTRAMUSCULAR; INTRAVENOUS at 11:05

## 2023-07-26 RX ADMIN — MIDAZOLAM HYDROCHLORIDE 2 MG: 1 INJECTION, SOLUTION INTRAMUSCULAR; INTRAVENOUS at 10:15

## 2023-07-26 RX ADMIN — CEFAZOLIN SODIUM 2000 MG: 2 SOLUTION INTRAVENOUS at 11:00

## 2023-07-26 RX ADMIN — SODIUM CHLORIDE, SODIUM LACTATE, POTASSIUM CHLORIDE, AND CALCIUM CHLORIDE: .6; .31; .03; .02 INJECTION, SOLUTION INTRAVENOUS at 09:30

## 2023-07-26 NOTE — ANESTHESIA PROCEDURE NOTES
Peripheral Block    Patient location during procedure: pre-op  Start time: 7/26/2023 10:15 AM  Reason for block: at surgeon's request and post-op pain management  Staffing  Performed by: Ernestina Persaud MD  Authorized by: Ernestina Persaud MD    Preanesthetic Checklist  Completed: patient identified, IV checked, site marked, risks and benefits discussed, surgical consent, monitors and equipment checked, pre-op evaluation and timeout performed  Peripheral Block  Patient position: supine  Prep: ChloraPrep  Patient monitoring: continuous pulse ox and frequent blood pressure checks  Block type: supraclavicular  Laterality: right  Injection technique: single-shot  Procedures: ultrasound guided, Ultrasound guidance required for the procedure to increase accuracy and safety of medication placement and decrease risk of complications.   Ultrasound permanent image saved  Needle  Needle type: pencil-tip   Needle gauge: 20 G  Needle length: 5 cm  Needle localization: anatomical landmarks and ultrasound guidance  Test dose: negative  Assessment  Injection assessment: incremental injection and local visualized surrounding nerve on ultrasound  Paresthesia pain: none  Heart rate change: no  Slow fractionated injection: yes  Post-procedure:  site cleaned  patient tolerated the procedure well with no immediate complications  Additional Notes  Uncomplicated single shot right supraclavicular nerve block

## 2023-07-26 NOTE — ANESTHESIA POSTPROCEDURE EVALUATION
Post-Op Assessment Note    CV Status:  Stable  Pain Score: 0    Pain management: adequate     Mental Status:  Alert and awake   Hydration Status:  Euvolemic   PONV Controlled:  Controlled   Airway Patency:  Patent      Post Op Vitals Reviewed: Yes      Staff: CRNA         No notable events documented.     BP   90/55   Temp     Pulse  65   Resp   17   SpO2   94

## 2023-07-26 NOTE — ANESTHESIA PREPROCEDURE EVALUATION
Procedure:  CLOSED REDUCTION PERCUTANEOUS PINNING VERSUS OPEN REDUCTION W/ INTERNAL FIXATION (ORIF) FOURTH AND FIFTH METACARPAL (Right: Hand)    Relevant Problems   No relevant active problems        Physical Exam    Airway    Mallampati score: II  TM Distance: >3 FB  Neck ROM: full     Dental       Cardiovascular      Pulmonary      Other Findings        Anesthesia Plan  ASA Score- 1     Anesthesia Type- IV sedation with anesthesia with ASA Monitors. Additional Monitors:   Airway Plan:     Comment: MAC with PNB supraclavicular. Plan Factors-Exercise tolerance (METS): >4 METS. Chart reviewed. Patient is not a current smoker. Patient did not smoke on day of surgery. Obstructive sleep apnea risk education given perioperatively. Induction- intravenous. Postoperative Plan- Plan for postoperative opioid use. Planned trial extubation    Informed Consent- Anesthetic plan and risks discussed with patient. I personally reviewed this patient with the CRNA. Discussed and agreed on the Anesthesia Plan with the CRNA. .          NPO appropriate. Discussed benefits/risks of monitored anesthetic care and discussed providing a dynamic level of mild to deep sedation. Risks include awareness, airway obstruction, aspiration which may necessitate conversion to general anesthesia. All questions answered. Patient understands and wishes to proceed. Anesthesia plan and consent discussed with Juhi Becker who expressed understanding and agreement. Risks/benefits and alternatives discussed with patient including possible PONV, sore throat, damage to teeth/lips/gums and possibility of rare anesthetic and surgical emergencies.

## 2023-07-26 NOTE — OP NOTE
OPERATIVE REPORT  PATIENT NAME: Tracey Willson :  1995  MRN: [id number]  Pt Location: [facility] OR ROOM 04    SURGERY DATE: 2023     Surgeon(s) and Role:     * Inga Boeck, MD - Primary     * Mirella Conroy PA-C - Assisting    Physician Assistant need: A physician assistant was required during the procedure for reduction, pinning, and splinting. No qualified resident was available. Pre-Op Diagnosis Codes:  Closed displaced fracture of fourth metacarpal neck of right hand  Closed displaced fracture of fifth metacarpal neck of right hand    Post-Op Diagnosis Codes:  Closed displaced fracture of fourth metacarpal neck of right hand  Closed displaced fracture of fifth metacarpal neck of right hand    Procedure(s) (LRB):  CLOSED REDUCTION PERCUTANEOUS PINNING FOURTH METACARPAL NECK FRACTURE  CLOSED REDUCTION PERCUTANEOUS PINNING FIFTH METACARPAL NECK FRACTURE    Specimen(s):  * No specimens in log *    Estimated Blood Loss:   Minimal    Drains:  None    Implants:  Implant Name Type Inv. Item Serial No.  Lot No. LRB No. Used Action   [employer] . 062 - [id number]  [employer] . [facility] [id number] Right 4 Implanted       Anesthesia Type:   IV Sedation with Anesthesia    Operative Indications:  Patient is a 34 y.o. male evaluated in clinic with Right fourth and fifth metacarpal neck fractures. Radiographs were reviewed with patient. We discussed treatment options with patient including conservative management and surgical management. Discussed nonoperative management with immobilization. We discussed operative management with closed reduction percutaneous pinning versus open reduction internal fixation. Given multiple metacarpal fractures and 50 degrees angulation of fourth metacarpal fracture and 60 degrees of fifth metacarpal fracture on radiographs, we recommended operative management. Patient elected for surgical management. Informed consent was obtained. Operative Findings:  Right fourth and fifth metacarpal fractures that were able to be close reduced and percutaneously pinned. Complications:   None     Procedure and Technique:  Patient was identified in the preoperative holding area. Surgical sites were marked with patient participation. Patient was taken back to the operating theater. Anesthesia was induced. Arm tourniquet was applied, however, never inflated. Extremity was prepped and draped in typical fashion. Formal time-out was performed confirming site, patient, and procedure. All present were in agreement. Fracture reduction was performed under fluoroscopy. Fracture reduction was successful with volar to dorsal pressure applied to metacarpal heads. Procedure began with fourth metacarpal. Two 0.062 pins were introduced at the collateral recesses of metacarpal and driven across fracture site bicortically and in crossing fashion. Attention was turned to fifth metacarpal. Two 0.062 pins were introduced at the collateral recesses of metacarpal and driven across fracture site bicortically and in crossing fashion. Final radiographs were performed and confirmed appropriate alignment. Digital alignment was inspected and there was appropriate rotational and angular alignment. Wounds were dressed with Xeroform, 4x4s, cast padding, ulnar gutter splint, and Ace bandage. Patient was taken to PACU in stable condition. Postoperative Plan: We will see the patient at around the 2-week amirah. We will leave pins in until 6 weeks pending radiographic healing.       Patient Disposition:  PACU         SIGNATURE: Catrachita Rod MD  DATE: July 26, 2023  TIME: 12:16 PM

## 2023-07-26 NOTE — DISCHARGE INSTR - AVS FIRST PAGE
Post Operative Instructions    You have had surgery on your arm today, please read and follow the information below:  Elevate your hand above your elbow during the next 24-48 hours to help with swelling. Place your hand and arm over your head with motion at your shoulder three times a day. Do not apply any cream/ointment/oil to your incisions including antibiotics (I.e.Neosporin)  Do not use peroxide to clean your wound   Do not soak your hands in standing water (dishwater, tubs, Jacuzzi's, pools, etc.) until given permission (typically 2-3 weeks after injury)    Call the office if you notice any:  Increased numbness or tingling of your hand or fingers that is not relieved with elevation. Increasing pain that is not controlled with medication. Difficulty chewing, breathing, swallowing. Chest pains or shortness of breath. Fever over 101.4 degrees. Bandage: Do NOT remove bandage until follow-up appointment. Motion: Move fingers into a fist 5 times a day, DO NOT move any splinted fingers. Weight bearing status: The operated extremity should be non-weight bearing until further notice. Ice: Ice for 10 minutes every hour as needed for swelling x 24 hours. Sling: No sling necessary. Pain medication:   Oxycodone 1 tab every 6 hours AS NEEDED for pain  You may take Tylenol (acetaminophen) in addition to your pain medication. This is over the counter. Please follow the instructions on the bottle. Do not take more than 3000 mg per day. Therapy: No therapy needed at this time. Follow-up Appointment: 10-14 days. Please call the office if you have any questions or concerns regarding your post-operative care.

## 2023-07-26 NOTE — INTERVAL H&P NOTE
H&P reviewed. After examining the patient I find no changes in the patients condition since the H&P had been written.     Vitals:    07/26/23 1006   BP: 115/58   Pulse: 75   Resp: 18   Temp:    SpO2: 99%

## 2023-07-28 ENCOUNTER — TELEPHONE (OUTPATIENT)
Dept: OBGYN CLINIC | Facility: HOSPITAL | Age: 28
End: 2023-07-28

## 2023-07-28 DIAGNOSIS — Z47.89 AFTERCARE FOLLOWING SURGERY OF THE MUSCULOSKELETAL SYSTEM: ICD-10-CM

## 2023-07-28 DIAGNOSIS — S62.336D CLOSED DISPLACED FRACTURE OF NECK OF FIFTH METACARPAL BONE OF RIGHT HAND WITH ROUTINE HEALING: ICD-10-CM

## 2023-07-28 DIAGNOSIS — S62.334D CLOSED DISPLACED FRACTURE OF NECK OF FOURTH METACARPAL BONE OF RIGHT HAND WITH ROUTINE HEALING, SUBSEQUENT ENCOUNTER: ICD-10-CM

## 2023-07-28 RX ORDER — SENNOSIDES 8.6 MG
650 CAPSULE ORAL 3 TIMES DAILY
Qty: 30 TABLET | Refills: 0 | Status: SHIPPED | OUTPATIENT
Start: 2023-07-28

## 2023-07-28 RX ORDER — IBUPROFEN 600 MG/1
600 TABLET ORAL 3 TIMES DAILY
Qty: 30 TABLET | Refills: 0 | Status: SHIPPED | OUTPATIENT
Start: 2023-07-28

## 2023-07-28 RX ORDER — OXYCODONE HYDROCHLORIDE 5 MG/1
5 TABLET ORAL EVERY 6 HOURS PRN
Qty: 10 TABLET | Refills: 0 | Status: SHIPPED | OUTPATIENT
Start: 2023-07-28 | End: 2023-08-07

## 2023-07-28 NOTE — TELEPHONE ENCOUNTER
Pt contacted Call Center requested refill of their medication. Medication Name: Oxycodone      Dosage of Med: 5mg      Frequency of Med: every 4 hrs      Remaining Medication: 4      [facility] and [facility] Preferred Callback Phone Number: [phone]      Thank you. PLEASE ADVISE PATIENTS:    REFILL REQUESTS WILL BE PROCESSED WITHIN 24-48 HOURS.

## 2023-08-04 ENCOUNTER — OFFICE VISIT (OUTPATIENT)
Dept: OBGYN CLINIC | Facility: CLINIC | Age: 28
End: 2023-08-04

## 2023-08-04 ENCOUNTER — HOSPITAL ENCOUNTER (OUTPATIENT)
Dept: RADIOLOGY | Facility: HOSPITAL | Age: 28
End: 2023-08-04
Attending: STUDENT IN AN ORGANIZED HEALTH CARE EDUCATION/TRAINING PROGRAM

## 2023-08-04 VITALS — BODY MASS INDEX: 31.61 KG/M2 | HEIGHT: 68 IN | WEIGHT: 208.6 LBS

## 2023-08-04 DIAGNOSIS — S62.336D CLOSED DISPLACED FRACTURE OF NECK OF FIFTH METACARPAL BONE OF RIGHT HAND WITH ROUTINE HEALING: ICD-10-CM

## 2023-08-04 DIAGNOSIS — Z47.89 AFTERCARE FOLLOWING SURGERY OF THE MUSCULOSKELETAL SYSTEM: ICD-10-CM

## 2023-08-04 DIAGNOSIS — S62.334D CLOSED DISPLACED FRACTURE OF NECK OF FOURTH METACARPAL BONE OF RIGHT HAND WITH ROUTINE HEALING, SUBSEQUENT ENCOUNTER: ICD-10-CM

## 2023-08-04 DIAGNOSIS — S62.334D CLOSED DISPLACED FRACTURE OF NECK OF FOURTH METACARPAL BONE OF RIGHT HAND WITH ROUTINE HEALING, SUBSEQUENT ENCOUNTER: Primary | ICD-10-CM

## 2023-08-04 PROCEDURE — 73130 X-RAY EXAM OF HAND: CPT

## 2023-08-04 PROCEDURE — 99024 POSTOP FOLLOW-UP VISIT: CPT | Performed by: STUDENT IN AN ORGANIZED HEALTH CARE EDUCATION/TRAINING PROGRAM

## 2023-08-04 PROCEDURE — 29125 APPL SHORT ARM SPLINT STATIC: CPT | Performed by: STUDENT IN AN ORGANIZED HEALTH CARE EDUCATION/TRAINING PROGRAM

## 2023-08-04 NOTE — PROGRESS NOTES
Assessment/Plan:  Patient ID: 34 y.o. male   Surgery: Closed Reduction Percutaneous Pinning Fourth And Fifth Metacarpal - Right  Date of Surgery: 7/26/2023    9 days s/p above surgery. Pins will remain clean, dry and intact until 6 weeks post op, at which time the pins will be pulled in the office. He was placed into a ulnar gutter splint, which will remain clean, dry and intact until his follow up visit. Follow up in 4.5 weeks for left hand x-rays prior to the pins being pulled. Follow Up:  4.5 weeks     To Do Next Visit:  Splint off, X-rays of the  right  hand with 10 degree supinated lateral view and Pins out      CHIEF COMPLAINT:  Chief Complaint   Patient presents with   • Right Hand - Post-op         SUBJECTIVE:  Patient is a 34y.o. year old male who presents for follow up after Closed Reduction Percutaneous Pinning Fourth And Fifth Metacarpal - Right. Natividad Connell is doing well. He is taking Oxycodone, Ibuprofen and Tylenol for pain control. He denies associated numbness and tingling. PHYSICAL EXAMINATION:  General: Well developed and well nourished, alert and oriented x 3, appears comfortable  Psychiatric: Normal    MUSCULOSKELETAL EXAMINATION:  Incision: clean, dry and pins intact   Surgery Site: normal, no evidence of infection   Range of Motion: As expected  Neurovascular status: Neuro intact, good cap refill  Done today: Splint changed      STUDIES REVIEWED:  Xrays of the right hand  were reviewed and independently interpreted in PACS by Dr. Nidhi Magaña and demonstrate percutaneous pinning of 4th and 5th metacarpal fractures. PROCEDURES PERFORMED:  Splint application    Date/Time: 8/4/2023 3:15 PM    Performed by: Fran Norman MD  Authorized by: Fran Norman MD  Universal Protocol:  Consent: Verbal consent obtained. Written consent not obtained.   Risks and benefits: risks, benefits and alternatives were discussed  Consent given by: patient  Patient understanding: patient states understanding of the procedure being performed  Patient identity confirmed: verbally with patient      Pre-procedure details:     Sensation:  Normal  Procedure details:     Laterality:  Right    Location:  Hand    Hand:  R hand    Splint type:  Ulnar gutter    Supplies:  Cotton padding, Ortho-Glass, 2 layer wrap, skin protective strip and elastic bandage  Post-procedure details:     Sensation:  Normal    Patient tolerance of procedure:   Tolerated well, no immediate complications           Scribe Attestation    I,:  Jame Joy am acting as a scribe while in the presence of the attending physician.:       I,:  Belkys Iqbal MD personally performed the services described in this documentation    as scribed in my presence.:

## 2023-09-08 ENCOUNTER — OFFICE VISIT (OUTPATIENT)
Dept: OBGYN CLINIC | Facility: CLINIC | Age: 28
End: 2023-09-08

## 2023-09-08 ENCOUNTER — HOSPITAL ENCOUNTER (OUTPATIENT)
Dept: RADIOLOGY | Facility: HOSPITAL | Age: 28
End: 2023-09-08
Attending: STUDENT IN AN ORGANIZED HEALTH CARE EDUCATION/TRAINING PROGRAM

## 2023-09-08 VITALS
DIASTOLIC BLOOD PRESSURE: 84 MMHG | SYSTOLIC BLOOD PRESSURE: 142 MMHG | HEART RATE: 93 BPM | HEIGHT: 68 IN | BODY MASS INDEX: 31.52 KG/M2 | WEIGHT: 208 LBS

## 2023-09-08 DIAGNOSIS — S62.334D CLOSED DISPLACED FRACTURE OF NECK OF FOURTH METACARPAL BONE OF RIGHT HAND WITH ROUTINE HEALING, SUBSEQUENT ENCOUNTER: ICD-10-CM

## 2023-09-08 DIAGNOSIS — S62.334D CLOSED DISPLACED FRACTURE OF NECK OF FOURTH METACARPAL BONE OF RIGHT HAND WITH ROUTINE HEALING, SUBSEQUENT ENCOUNTER: Primary | ICD-10-CM

## 2023-09-08 PROCEDURE — 99024 POSTOP FOLLOW-UP VISIT: CPT | Performed by: STUDENT IN AN ORGANIZED HEALTH CARE EDUCATION/TRAINING PROGRAM

## 2023-09-08 PROCEDURE — 73130 X-RAY EXAM OF HAND: CPT

## 2023-09-08 NOTE — PROGRESS NOTES
Assessment/Plan:  Patient ID: 34 y.o. male   Surgery: Closed Reduction Percutaneous Pinning Fourth And Fifth Metacarpal - Right  Date of Surgery: 7/26/2023    6 weeks s/p above surgery  Resume activities as tolerated, NSAIDs, Tylenol, therapy, bracing and don't submerge hand, running water is fine. TKO order fitted today, patient can wear as needed. PT/OT order was placed. Follow Up:  6 weeks    To Do Next Visit:  Re-evaluation of current issue, Splint off and X-rays of the  right  hand with 10 degree supinated lateral view    CHIEF COMPLAINT:  Chief Complaint   Patient presents with   • Right Hand - Post-op     SUBJECTIVE:  Patient is a 34y.o. year old male who presents for follow up after Closed Reduction Percutaneous Pinning Fourth And Fifth Metacarpal - Right. Today patient states he is doing well. He notes he occasionally takes tylenol for pain at night. Patient denies numbness/tingling. PHYSICAL EXAMINATION:  General: Well developed and well nourished, alert and oriented x 3, appears comfortable  Psychiatric: Normal    MUSCULOSKELETAL EXAMINATION:  Incision: Clean, dry, intact  Surgery Site: normal, no evidence of infection   Range of Motion: As expected  Neurovascular status: Neuro intact, good cap refill  Done today: Pin removed    STUDIES REVIEWED:  Xrays of the right hand were reviewed and independently interpreted in PACS by Dr. Rashard Peres and demonstrate hardware intact.  Fourth and fifth metacarpal neck fractures with interval healing     PROCEDURES PERFORMED:  Procedures  No Procedures performed today    Scribe Attestation    I,:  Ana Choe am acting as a scribe while in the presence of the attending physician.:       I,:  Rene Peace MD personally performed the services described in this documentation    as scribed in my presence.:

## 2023-10-20 ENCOUNTER — HOSPITAL ENCOUNTER (OUTPATIENT)
Dept: RADIOLOGY | Facility: HOSPITAL | Age: 28
End: 2023-10-20
Attending: STUDENT IN AN ORGANIZED HEALTH CARE EDUCATION/TRAINING PROGRAM

## 2023-10-20 ENCOUNTER — OFFICE VISIT (OUTPATIENT)
Dept: OBGYN CLINIC | Facility: CLINIC | Age: 28
End: 2023-10-20

## 2023-10-20 VITALS
HEART RATE: 81 BPM | BODY MASS INDEX: 31.52 KG/M2 | HEIGHT: 68 IN | SYSTOLIC BLOOD PRESSURE: 153 MMHG | DIASTOLIC BLOOD PRESSURE: 86 MMHG | WEIGHT: 208 LBS

## 2023-10-20 DIAGNOSIS — Z98.890 STATUS POST SURGERY: Primary | ICD-10-CM

## 2023-10-20 DIAGNOSIS — Z98.890 STATUS POST SURGERY: ICD-10-CM

## 2023-10-20 PROCEDURE — 73130 X-RAY EXAM OF HAND: CPT

## 2023-10-20 NOTE — PROGRESS NOTES
Assessment/Plan:  Patient ID: 34 y.o. male   Surgery: Closed Reduction Percutaneous Pinning Fourth And Fifth Metacarpal - Right  Date of Surgery: 7/26/2023    X-rays were reviewed in the office today which demonstrate healed 4th and 5th metacarpal fracture. The patient is very stiff on exam. A referral was provided to OT and I would like him seen next week. As patient is self pay, we messaged some of our therapists to try to get him in and work on motion at a reasonable price. He will continue with HEP and was instructed to work on passive motion and to work through some pain. He will follow up in 4 weeks for repeat evaluation. No new x-rays are needed. Follow Up:  4 weeks    To Do Next Visit:  Re-evaluation of current issue      CHIEF COMPLAINT:  Chief Complaint   Patient presents with   • Right Hand - Post-op         SUBJECTIVE:  Patient is a 34y.o. year old male who presents for follow up after Closed Reduction Percutaneous Pinning Fourth And Fifth Metacarpal - Right. Today patient states he is doing well. He has been working on exercises at home. PHYSICAL EXAMINATION:  General: Well developed and well nourished, alert and oriented x 3, appears comfortable  Psychiatric: Normal    MUSCULOSKELETAL EXAMINATION:  Incision: Clean, dry, intact  Surgery Site: normal, no evidence of infection   Range of Motion: unable to make full composite fist. Able to fully extend digits. Good IP motion. MCP flexion of ring finger to 45 degrees. MCP flexion of small finger to 20 degrees. Neurovascular status: Neuro intact, good cap refill         STUDIES REVIEWED:  Xrays of the right hand were reviewed and independently interpreted in PACS by Dr. Julio Urbina and demonstrate healed 4th and 5th metacarpal fx's.         PROCEDURES PERFORMED:  Procedures  No Procedures performed today    Scribe Attestation    I,:  Krista Joy MA am acting as a scribe while in the presence of the attending physician.:       I,:  Jennifer Barriga Sofía Espinal MD personally performed the services described in this documentation    as scribed in my presence.:

## 2023-10-25 ENCOUNTER — EVALUATION (OUTPATIENT)
Dept: OCCUPATIONAL THERAPY | Facility: CLINIC | Age: 28
End: 2023-10-25

## 2023-10-25 DIAGNOSIS — S62.304D CLOSED DISPLACED FRACTURE OF FOURTH METACARPAL BONE OF RIGHT HAND WITH ROUTINE HEALING, UNSPECIFIED PORTION OF METACARPAL, SUBSEQUENT ENCOUNTER: ICD-10-CM

## 2023-10-25 DIAGNOSIS — Z98.890 STATUS POST SURGERY: ICD-10-CM

## 2023-10-25 DIAGNOSIS — Z48.89 AFTERCARE FOLLOWING SURGERY: Primary | ICD-10-CM

## 2023-10-25 PROCEDURE — 97165 OT EVAL LOW COMPLEX 30 MIN: CPT

## 2023-10-25 NOTE — PROGRESS NOTES
OT Evaluation     Today's date: 10/25/2023  Patient name: Justin Sr. : 1995  MRN: [id number]  Referring provider: Fani Archer MD  Dx:   Encounter Diagnosis     ICD-10-CM    1. Aftercare following surgery  Z48.89       2. Closed displaced fracture of fourth metacarpal bone of right hand with routine healing, unspecified portion of metacarpal, subsequent encounter  S62.304D                      Assessment  Assessment details: Patient presents s/p Closed Reduction and Percutaneous Pinning of the Fourth And Fifth Metacarpal. Patient surgery took place on 23. Patient initial injury occurred on 23 when they punched a forklift. Patient had their initial appointment with orthopedics on 23 where they were placed in an ulnar gutter splint. Patient had the surgical pins removed on 23 and x-rays taken on 10/20/23 displayed fully healed fractures. Patient presents with significantly limited MCP flexion. PIP/DIP ROM is decreased as well compared to the contralateral side. Patient  strength is decreased compared to the contralateral side. Therapist fabricated a custom static progressive splint to increase MCP ROM. Therapist provided patient with a wear schedule of 15 minutes 3x per day. Patient was also provided a custom HEP to increase digit ROM. See below for a more detailed assessment.    Impairments: abnormal coordination, abnormal or restricted ROM, activity intolerance, impaired physical strength and pain with function    Symptom irritability: lowUnderstanding of Dx/Px/POC: good   Prognosis: good    Goals  STGs:    Patient will increase ROM in the 4th and 5th MCP joint by 20-30 degrees in all planes in 4 weeks    Patient will increase  strength by 5-10 lbs in 4 weeks    Patient will report decreased pain during functional movement by 1-2 grades in 4 weeks    Patient will demonstrate an understanding of HEP by end of initial session    LTGs:    Patient will display digit ROM WFL in 8 weeks or discharge    Patient will increase  strength by 10-20 lbs in 8 weeks or discharge    Patient will report resolution of pain symptoms during functional movement in 8 weeks or discharge    Plan  Plan details: Patient presenting to OP OT services s/p Closed Reduction and Percutaneous Pinning of the Fourth And Fifth Metacarpal, Patient would benefit from skilled occupational therapy services 2 times per week for 6-8 weeks to return to prior level of function and achieve all of their established goals. Thank you for the referral. Patient opted to schedule only 1x per week secondary to being self pay. Will increase if required. Patient would benefit from: custom splinting, skilled occupational therapy and OT eval  Referral necessary: No  Planned modality interventions: ultrasound, thermotherapy: hydrocollator packs and unattended electrical stimulation  Planned therapy interventions: IADL retraining, patient education, self care, manual therapy, orthotic fitting/training, strengthening, stretching, therapeutic activities, therapeutic exercise, home exercise program, functional ROM exercises and fine motor coordination training  Frequency: 2x week  Duration in visits: 10  Plan of Care beginning date: 10/25/2023  Plan of Care expiration date: 12/25/2023  Treatment plan discussed with: patient        Subjective Evaluation    History of Present Illness  Date of onset: 7/22/2023  Date of surgery: 7/26/2023  Mechanism of injury: surgery and trauma  Mechanism of injury: Mechanism: Punched forklift    Surgery: Closed Reduction Percutaneous Pinning Fourth And Fifth Metacarpal    Subjective:  "It feels good". "The biggest trouble is the pinky". "I can lift things and it doesn't hurt much". " I don't  weights yet". "It feels much looser, but it is feeling a lot better now".            Not a recurrent problem   Quality of life: good    Patient Goals  Patient goals for therapy: decreased pain, increased motion, increased strength, return to work and independence with ADLs/IADLs    Pain  Current pain ratin  At best pain ratin  At worst pain ratin  Location: 4th and 5th digit  Quality: sharp  Relieving factors: rest and relaxation  Aggravating factors: lifting (Stretching, overuse, lifting,)  Progression: improved    Hand dominance: right      Diagnostic Tests  X-ray: normal  Treatments  Previous treatment: immobilization  Current treatment: occupational therapy        Objective     Tenderness     Right Wrist/Hand   No tenderness in the DIP joint, MCP joint and PIP joint.      Neurological Testing     Sensation     Wrist/Hand   Left   Intact: light touch    Right   Intact: light touch    Active Range of Motion     Left Wrist   Normal active range of motion    Right Wrist   Normal active range of motion    Left Thumb   Kapandji score: 10 degrees    Opposition: Full opposition    Right Thumb   Opposition: Full opposition  Kapandji score: 10 degrees    Right Digits   Flexion   Ring     MCP: 42    PIP: 96    DIP: 74  Little     MCP: 20    PIP: 86    DIP: 70    Additional Active Range of Motion Details  R Full extension  L Full composite fist    Passive Range of Motion     Right Digits   Flexion   Ring     MCP: 70  Little     MCP: 46    Strength/Myotome Testing     Left Wrist/Hand   Normal wrist strength     (2nd hand position)     Trial 1: 98    Right Wrist/Hand   Normal wrist strength     (2nd hand position)     Trial 1: 43    Swelling     Left Wrist/Hand   Circumference MCP: 20.7 cm  Circumference wrist: 18.4 cm    Right Wrist/Hand   Ring     Proximal: 6 cm    Middle: 4.5 cm    Distal: 4.5 cm  Little     Proximal: 6.4 cm    Middle: 5.5 cm    Distal: 5 cm  Circumference MCP: 21 cm  Circumference wrist: 16.9 cm             Precautions: 4th and 5th Metacarpal Pinning, Pins removed 23  - No restrictions      Manuals 10/25            Splint Static progressive fabricated Neuro Re-Ed                                                                                                        Ther Ex             HEP Digit PROM  Blocking  Tendon glides            Digit PROM             Blocking             Hook fist pegs             Pencils             TB on wall             Digiflex             Hand Power Pro             Ther Activity             Keypegs             Pinch Ring                                                    Modalities             P

## 2023-11-03 ENCOUNTER — OFFICE VISIT (OUTPATIENT)
Dept: OCCUPATIONAL THERAPY | Facility: CLINIC | Age: 28
End: 2023-11-03

## 2023-11-03 DIAGNOSIS — S62.304D CLOSED DISPLACED FRACTURE OF FOURTH METACARPAL BONE OF RIGHT HAND WITH ROUTINE HEALING, UNSPECIFIED PORTION OF METACARPAL, SUBSEQUENT ENCOUNTER: Primary | ICD-10-CM

## 2023-11-03 PROCEDURE — 97110 THERAPEUTIC EXERCISES: CPT

## 2023-11-03 NOTE — PROGRESS NOTES
Daily Note     Today's date: 11/3/2023  Patient name: Isma Gates. : 1995  MRN: [id number]  Referring provider: Misti Araujo MD  Dx:   Encounter Diagnosis     ICD-10-CM    1. Closed displaced fracture of fourth metacarpal bone of right hand with routine healing, unspecified portion of metacarpal, subsequent encounter  S62.304D                      Subjective: That splint really made a difference      Objective: See treatment diary below  4th: 62  5th: 48    0677-6559 MHP  9467-8974 TE 8'  [id number] unsup    Assessment: Tolerated treatment well. Patient displayed significant gains in MCP ROM. Patient is able to actively touch the palm of the hand with al digits and passively is able to touch the [facility]. Plan: Continue per plan of care. Progress treatment as tolerated.        Precautions: 4th and 5th Metacarpal Pinning, Pins removed 23  - No restrictions      Manuals 10/25 11/3           Splint Static progressive fabricated                                                   Neuro Re-Ed                                                                                                        Ther Ex             HEP Digit PROM  Blocking  Tendon glides            Digit PROM  8'           Blocking  x20           Hook fist pegs  X5 down and back           Pencils  x20           TB on wall  x5           Digiflex  R iso x10    G comp x20           Hand Power Pro  Y x 20           Ther Activity             Keypegs  x1           Pinch Ring  R/Y x1                                                  Modalities             MHP  5'

## 2023-11-10 ENCOUNTER — OFFICE VISIT (OUTPATIENT)
Dept: OCCUPATIONAL THERAPY | Facility: CLINIC | Age: 28
End: 2023-11-10

## 2023-11-10 DIAGNOSIS — S62.304D CLOSED DISPLACED FRACTURE OF FOURTH METACARPAL BONE OF RIGHT HAND WITH ROUTINE HEALING, UNSPECIFIED PORTION OF METACARPAL, SUBSEQUENT ENCOUNTER: Primary | ICD-10-CM

## 2023-11-10 PROCEDURE — 97110 THERAPEUTIC EXERCISES: CPT

## 2023-11-10 NOTE — PROGRESS NOTES
Daily Note     Today's date: 11/10/2023  Patient name: Justin Sr. : 1995  MRN: [id number]  Referring provider: Fani Archer MD  Dx:   Encounter Diagnosis     ICD-10-CM    1. Closed displaced fracture of fourth metacarpal bone of right hand with routine healing, unspecified portion of metacarpal, subsequent encounter  S62.304D                      Subjective: It's feeling better and better      Objective: See treatment diary below  4th: 62  5th: 48    7921-4598 P  1287-0448 TE 8'  9409-6406 unusp    Assessment: Tolerated treatment well. Patient continued to display increasing MCP ROM. Splint continues to assist with gains in motion. Plan: Continue per plan of care. Progress treatment as tolerated.        Precautions: 4th and 5th Metacarpal Pinning, Pins removed 23  - No restrictions      Manuals 10/25 11/3 11/10          Splint Static progressive fabricated                                                   Neuro Re-Ed                                                                                                        Ther Ex             HEP Digit PROM  Blocking  Tendon glides            Digit PROM  8'           Blocking  x20 x20          Hook fist pegs  X5 down and back X5 down and bacl          Pencils  x20 x20          TB on wall  x5 x5          Digiflex  R iso x10    G comp x20 R iso x10    G comp x20Y x20          Hand Power Pro  Y x 20           Ther Activity             Keypegs  x1 X1 translation          Pinch Ring  R/Y x1 R/G x1                                                 Modalities             P  5' 5'

## 2023-11-17 ENCOUNTER — OFFICE VISIT (OUTPATIENT)
Dept: OCCUPATIONAL THERAPY | Facility: CLINIC | Age: 28
End: 2023-11-17

## 2023-11-17 ENCOUNTER — OFFICE VISIT (OUTPATIENT)
Dept: OBGYN CLINIC | Facility: CLINIC | Age: 28
End: 2023-11-17

## 2023-11-17 VITALS — BODY MASS INDEX: 31.52 KG/M2 | WEIGHT: 208 LBS | HEIGHT: 68 IN

## 2023-11-17 DIAGNOSIS — S62.304D CLOSED DISPLACED FRACTURE OF FOURTH METACARPAL BONE OF RIGHT HAND WITH ROUTINE HEALING, UNSPECIFIED PORTION OF METACARPAL, SUBSEQUENT ENCOUNTER: Primary | ICD-10-CM

## 2023-11-17 DIAGNOSIS — Z98.890 STATUS POST SURGERY: Primary | ICD-10-CM

## 2023-11-17 DIAGNOSIS — Z48.89 AFTERCARE FOLLOWING SURGERY: ICD-10-CM

## 2023-11-17 DIAGNOSIS — Z98.890 STATUS POST SURGERY: ICD-10-CM

## 2023-11-17 PROCEDURE — 64450 NJX AA&/STRD OTHER PN/BRANCH: CPT | Performed by: STUDENT IN AN ORGANIZED HEALTH CARE EDUCATION/TRAINING PROGRAM

## 2023-11-17 PROCEDURE — 97110 THERAPEUTIC EXERCISES: CPT

## 2023-11-17 PROCEDURE — 99213 OFFICE O/P EST LOW 20 MIN: CPT | Performed by: STUDENT IN AN ORGANIZED HEALTH CARE EDUCATION/TRAINING PROGRAM

## 2023-11-17 NOTE — PROGRESS NOTES
Daily Note     Today's date: 2023  Patient name: Daniela Lugo. : 1995  MRN: [id number]  Referring provider: Rehan Allen MD  Dx:   Encounter Diagnosis     ICD-10-CM    1. Closed displaced fracture of fourth metacarpal bone of right hand with routine healing, unspecified portion of metacarpal, subsequent encounter  S62.304D       2. Aftercare following surgery  Z48.89       3. Status post surgery  Z98.890                      Subjective: I can do everything now      Objective: See treatment diary below          Assessment: Tolerated treatment well. Patient displayed the ability to form a full composite fist. Patient has no difficulties with activities at this time. Patient and therapist discussed discharge from formal therapy as patient has made significant gains. Patient discharged at this time.        Plan: Discharged  Precautions: 4th and 5th Metacarpal Pinning, Pins removed 23  - No restrictions      Manuals 10/25 11/3 11/10 11/17         Splint Static progressive fabricated                                                   Neuro Re-Ed                                                                                                        Ther Ex             HEP Digit PROM  Blocking  Tendon glides            Digit PROM  8'  3'         Blocking  x20 x20 x20         Hook fist pegs  X5 down and back X5 down and bacl          Pencils  x20 x20 x20         TB on wall  x5 x5 Green x 5         Digiflex  R iso x10    G comp x20 R iso x10    G comp x20Y x20 G iso x 10    B comp x20         Hand Power Pro  Y x 20  O x 20         Ther Activity             Keypegs  x1 X1 translation          Pinch Ring  R/Y x1 R/G x1 R/R x1         Large pegs    In w/ red out with #15                                   Modalities             MHP  5' 5' 5'

## 2023-11-17 NOTE — PROGRESS NOTES
Assessment/Plan:  Patient ID: 34 y.o. male   Surgery: Closed Reduction Percutaneous Pinning Fourth And Fifth Metacarpal - Right  Date of Surgery: 7/26/2023    Patient was offered a manipulation in the office today. He tolerated the procedure well. Post manipulation ROM: ring MCP 90 degrees flexion small finger MCP 80 degrees. He will continue with HEP and was instructed to work on passive motion and to work through some pain. Follow Up:  PRN    To Do Next Visit:  Re-evaluation of current issue      CHIEF COMPLAINT:  Chief Complaint   Patient presents with   • Right Hand - Follow-up           SUBJECTIVE:  Patient is a 34y.o. year old male who presents for follow up after Closed Reduction Percutaneous Pinning Fourth And Fifth Metacarpal - Right. Today patient states he is doing well. He has been working on exercises at home. He attended OT for 4 visits. PHYSICAL EXAMINATION:  General: Well developed and well nourished, alert and oriented x 3, appears comfortable  Psychiatric: Normal    MUSCULOSKELETAL EXAMINATION:  Incision: Clean, dry, intact  Surgery Site: normal, no evidence of infection   Range of Motion: unable to make full composite fist. Able to fully extend digits. Good IP motion. MCP flexion of ring finger to 70 degrees. MCP flexion of small finger to 60 degrees. Neurovascular status: Neuro intact, good cap refill     Post manipulation ROM: ring MCP 90 degrees flexion small finger MCP 80 degrees flexion    STUDIES REVIEWED:  No studies to review       PROCEDURES PERFORMED:  Nerve block    Date/Time: 11/17/2023 3:45 PM    Performed by: Lawanda Bonilla MD  Authorized by: Lawanda Bonilla MD  Universal Protocol:  Consent: Verbal consent obtained.   Risks and benefits: risks, benefits and alternatives were discussed  Consent given by: patient  Time out: Immediately prior to procedure a "time out" was called to verify the correct patient, procedure, equipment, support staff and site/side marked as required. Patient understanding: patient states understanding of the procedure being performed  Site marked: the operative site was marked  Patient identity confirmed: verbally with patient    Location:     Body area:  Upper extremity    Upper extremity nerve:  Digital    Nerve type:  Peripheral    Laterality:  Right  Pre-procedure details:     Preparation: Patient was prepped and draped in usual sterile fashion    Procedure details (see MAR for exact dosages): Block needle gauge:  25 G    Anesthetic injected:  Lidocaine 1% w/o epi (8cc)    Steroid injected:  None    Additive injected:  None    Injection procedure:  Anatomic landmarks identified, introduced needle and anatomic landmarks palpated  Post-procedure details:     Dressing:  Sterile dressing    Outcome:  Anesthesia achieved    Patient tolerance of procedure:   Tolerated well, no immediate complications  Comments:      manipulation    No Procedures performed today    Scribe Attestation    I,:  Lisa Zepeda am acting as a scribe while in the presence of the attending physician.:       I,:  Susy Langston MD personally performed the services described in this documentation    as scribed in my presence.:

## 2023-11-24 ENCOUNTER — APPOINTMENT (OUTPATIENT)
Dept: OCCUPATIONAL THERAPY | Facility: CLINIC | Age: 28
End: 2023-11-24

## (undated) DEVICE — OCCLUSIVE GAUZE STRIP,3% BISMUTH TRIBROMOPHENATE IN PETROLATUM BLEND: Brand: XEROFORM

## (undated) DEVICE — PADDING CAST 4 IN  COTTON STRL

## (undated) DEVICE — STERILE BETHLEHEM PLASTIC HAND: Brand: CARDINAL HEALTH

## (undated) DEVICE — ACE WRAP 4 IN STERILE

## (undated) DEVICE — ACE WRAP 2 IN UNSTERILE

## (undated) DEVICE — SUT VICRYL 4-0 SH 27 IN J415H

## (undated) DEVICE — INTENDED FOR TISSUE SEPARATION, AND OTHER PROCEDURES THAT REQUIRE A SHARP SURGICAL BLADE TO PUNCTURE OR CUT.: Brand: BARD-PARKER ® CARBON RIB-BACK BLADES

## (undated) DEVICE — SPLINT 5 X 30 IN FAST SET PLASTER

## (undated) DEVICE — SPONGE SCRUB 4 PCT CHLORHEXIDINE

## (undated) DEVICE — GLOVE SRG BIOGEL 6.5

## (undated) DEVICE — CUFF TOURNIQUET 18 X 4 IN QUICK CONNECT DISP 1 BLADDER

## (undated) DEVICE — ACE WRAP 3 IN STERILE

## (undated) DEVICE — DRAPE KIT C-ARM W/PLATE PRTC FOOT SWITCH COVER

## (undated) DEVICE — SPLINT 3 X 15 IN XFAST SET PLASTER

## (undated) DEVICE — GAUZE SPONGES,16 PLY: Brand: CURITY

## (undated) DEVICE — GLOVE SRG BIOGEL ECLIPSE 7

## (undated) DEVICE — GLOVE INDICATOR PI UNDERGLOVE SZ 7 BLUE

## (undated) DEVICE — ADHESIVE SKIN HIGH VISCOSITY EXOFIN 1ML

## (undated) DEVICE — SUT MONOCRYL 4-0 PS-2 18 IN Y496G